# Patient Record
Sex: FEMALE | Race: WHITE | Employment: STUDENT | URBAN - METROPOLITAN AREA
[De-identification: names, ages, dates, MRNs, and addresses within clinical notes are randomized per-mention and may not be internally consistent; named-entity substitution may affect disease eponyms.]

---
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
## 2024-03-25 PROBLEM — J36 CELLULITIS OF TONSIL: Status: ACTIVE | Noted: 2021-04-21

## 2024-03-25 PROBLEM — M25.361 PATELLAR INSTABILITY OF RIGHT KNEE: Status: ACTIVE | Noted: 2024-03-25

## 2024-03-25 PROBLEM — M21.069 ACQUIRED GENU VALGUM: Status: ACTIVE | Noted: 2024-03-25

## 2024-03-25 PROBLEM — G89.29 CHRONIC PAIN OF RIGHT KNEE: Status: ACTIVE | Noted: 2024-03-25

## 2024-03-25 PROBLEM — M25.561 CHRONIC PAIN OF RIGHT KNEE: Status: ACTIVE | Noted: 2024-03-25

## 2025-04-18 ENCOUNTER — OFFICE VISIT (OUTPATIENT)
Dept: FAMILY MEDICINE CLINIC | Age: 20
End: 2025-04-18

## 2025-04-18 VITALS
DIASTOLIC BLOOD PRESSURE: 66 MMHG | TEMPERATURE: 97.4 F | HEART RATE: 109 BPM | OXYGEN SATURATION: 97 % | WEIGHT: 263 LBS | BODY MASS INDEX: 37.65 KG/M2 | SYSTOLIC BLOOD PRESSURE: 123 MMHG | HEIGHT: 70 IN

## 2025-04-18 DIAGNOSIS — B96.89 ACUTE BACTERIAL TONSILLITIS: Primary | ICD-10-CM

## 2025-04-18 DIAGNOSIS — R68.89 FLU-LIKE SYMPTOMS: ICD-10-CM

## 2025-04-18 DIAGNOSIS — J03.80 ACUTE BACTERIAL TONSILLITIS: Primary | ICD-10-CM

## 2025-04-18 LAB
Lab: NORMAL
PERFORMING INSTRUMENT: NORMAL
QC PASS/FAIL: NORMAL
S PYO AG THROAT QL: NORMAL
SARS-COV-2, POC: NORMAL

## 2025-04-18 PROCEDURE — 99213 OFFICE O/P EST LOW 20 MIN: CPT

## 2025-04-18 PROCEDURE — 87880 STREP A ASSAY W/OPTIC: CPT

## 2025-04-18 PROCEDURE — 87426 SARSCOV CORONAVIRUS AG IA: CPT

## 2025-04-18 RX ORDER — PREDNISONE 20 MG/1
20 TABLET ORAL 2 TIMES DAILY
Qty: 10 TABLET | Refills: 0 | Status: SHIPPED | OUTPATIENT
Start: 2025-04-18 | End: 2025-04-23

## 2025-04-18 NOTE — PROGRESS NOTES
Chief Complaint       Pharyngitis (Sinus drainage and congestion, cough, )      History of Present Illness   Source of history provided by:  patient.     Koki Murrell is a 19 y.o. old female presenting to the walk in clinic for evaluation of sinus congestion, ear pressure, cough and sore throat x 1 days. Pt states it feels like her tonsils are swollen. Has been taking nothing OTC for relief. Denies any fever, chills, wheezing, CP, SOB, or GI symptoms. Denies any hx of asthma, COPD, or tobacco use.  Pt has known sick exposure.    ROS    Unless otherwise stated in this report or unable to obtain because of the patient's clinical or mental status as evidenced by the medical record, this patients's positive and negative responses for Review of Systems, constitutional, psych, eyes, ENT, cardiovascular, respiratory, gastrointestinal, neurological, genitourinary, musculoskeletal, integument systems and systems related to the presenting problem are either stated in the preceding or were not pertinent or were negative for the symptoms and/or complaints related to the medical problem.      Physical Exam         VS:  /66   Pulse (!) 109   Temp 97.4 °F (36.3 °C)   Ht 1.778 m (5' 10\")   Wt 119.3 kg (263 lb)   LMP 04/16/2025   SpO2 97%   BMI 37.74 kg/m²    Oxygen Saturation Interpretation: Normal.    Physical Exam  Vitals and nursing note reviewed.   Constitutional:       General: She is not in acute distress.     Appearance: Normal appearance. She is well-developed and normal weight. She is not ill-appearing or toxic-appearing.   HENT:      Head: Normocephalic and atraumatic.      Right Ear: Hearing, tympanic membrane, ear canal and external ear normal.      Left Ear: Hearing, tympanic membrane, ear canal and external ear normal.      Nose: Congestion and rhinorrhea present.      Mouth/Throat:      Pharynx: Uvula midline. Posterior oropharyngeal erythema present.      Tonsils: 2+ on the right. 2+ on the